# Patient Record
Sex: FEMALE | Race: WHITE | NOT HISPANIC OR LATINO | Employment: FULL TIME | URBAN - METROPOLITAN AREA
[De-identification: names, ages, dates, MRNs, and addresses within clinical notes are randomized per-mention and may not be internally consistent; named-entity substitution may affect disease eponyms.]

---

## 2018-04-19 DIAGNOSIS — M54.2 CERVICALGIA: Primary | ICD-10-CM

## 2018-04-30 ENCOUNTER — CLINICAL SUPPORT (OUTPATIENT)
Dept: REHABILITATION | Facility: HOSPITAL | Age: 33
End: 2018-04-30
Payer: MEDICAID

## 2018-04-30 DIAGNOSIS — R53.1 WEAKNESS: ICD-10-CM

## 2018-04-30 DIAGNOSIS — R29.3 POSTURE IMBALANCE: ICD-10-CM

## 2018-04-30 PROCEDURE — 97162 PT EVAL MOD COMPLEX 30 MIN: CPT | Mod: PO

## 2018-04-30 PROCEDURE — 97110 THERAPEUTIC EXERCISES: CPT | Mod: PO

## 2018-05-01 NOTE — PLAN OF CARE
Physical Therapy Evaluation    Name: Mel Murphy  Clinic Number: [id number]    Medical Diagnosis: cervicalgia  PT Diagnosis:   Encounter Diagnoses   Name Primary?    Weakness     Posture imbalance      Physician: Rebecca Cotton, *  Treatment Orders: PT Eval and Treat    History     No past medical history on file.  No current outpatient prescriptions on file.     No current facility-administered medications for this visit.      Review of patient's allergies indicates:  Allergies not on file    Precautions: standard    Evaluation Date: 4/30/18  Visit # authorized: 1/20  Authorization period: 12/31/18  Plan of care Expiration: 6/29/18    Subjective   PMH: left cervical strain  PLOF: bootcamp: 2x/wk; work related activities    Occupation: Pt works as an  and job related duties include computer and phone usage    Primary concern/ Chief complaints:    Mel is a 32 y.o. female that presents to [facility] secondary to cervicalgia. Injury/surgery occurred approximately: ~3 weeks. Pt. presents with the following co-morbidities and personal factors that directly impact her plan of care: macromastia. X-ray/MRI was taken and revealed: none on file. Pt denies numbness and tingling in UE.  Pt denies SOB and/or pain radiating from back to front or along rib cage.     Vertebral artery symptoms: PMH of headaches (tension)    Red flags:  Pt. denies bowel/bladder symptoms. Recent weight loss? No; Constant/Night pain that is unchanging with change of position? No; PMH of CA? No    Onset/HALEY: sudden: ~3 weeks ago pt. was at work performing her job duties when she started experiencing right sided neck pain of insidious onset. Pt. reports she had just returned from vacation and did not perform any activity other than her usual job description. Pt. reports that she holds her phone at work between her shoulder and head (since 10/2017) due to new phones. Pt. went to the ER: 4/10/18: pt.  was given a prescription for muscle relaxer, pain medication; and x-rays were performed. Pt.'s current symptoms include limited movement, headaches, sleep disturbance, and job related activities increase pain (progressive throughout the day).     Previous treatment: none.    Pain Scale: Mel rates pain on a scale of 0-10 to be 10 at worst; 0 currently; 0 at best .    Aggravating Factors:driving, work related activities, sleep disturbance, reaching/lifting a 25# box overhead, carrying objects on shoulders, and work out regimen (bootcamp 2x/wk)  Relieving Factors: muscle relaxers prn and has a headset ordered    ADLs: Pt has a decrease ability to perform ADLs such as see above.    Patient Goals: Pt would like to decrease pain and increase function so she can return to her normal daily activities: prevent re-injury and return to PLOF    Objective     Observation: kyphotic posturing, macromastia    Posture: subcranial hyperextension, cervical flattened lordosis, FHP, mild Tspine kyphosis, rolled anterior/IR shoulders, and shoulder protraction    Cervical ROM: (measured in degrees)    Degrees Quality   Flexion 45 ERP centrally   Right Rotation 70 tightness while moving   Left Rotation 70    Right SB 40 ERP   Left SB 40      Shoulder Active/ Passive ROM: (measured in degrees)   Shoulder Right UE Left UE   Flexion  WNLs WNLs   Abduction WNLs WNLs   ER WNLs WNLs   IR WNLs WNLs     Sensation: Dermatomes: Intact bilateral UEs    Reflexes: C5/T1: 2 bilaterally    Strength: (measured in neutral spine, gradin-5 out of 5)   Cervical MMT   Flexion 3-/5   Extension 3-/5   Right Side Bend 3-/5   Left Side Bend 3-/5   Upper trap. 3+/5     Upper Extremity Strength: (grading 1-5 out of 5)      Right UE Left UE   Shoulder Flexion: 3+/5 3+/5   Shoulder Abduction: 3+/5 3+/5   Shoulder ER: 3-/5 3-/5   Shoulder IR: 3/5 3/5        Elbow Flexion: 3+/5 4-/5   Elbow Extension: 4-/5 4-/5         #1 5 5    #2      #3           EPL: 3-/5 3/5        Lower Trap: 3-/5 3-/5   Middle Trap: 3/5 3-/5   Rhomboids: 3/5 3-/5     Cervical Spine Special Tests: ((+): positive; (-): negative)   Compression -   Spurling's A +   Spurling's B +   Distraction -   Deep neck flexor test +   First Rib + bilaterallly left greater than right     Cervical musculature flexibility: (restriction: mild, mod.: moderate, severe grading)   Muscle Flexibility   suboccipitals mod.   upper trapezius mod.   levator scapula mod.   middle scalenes mod.   posterior scalenes mod.     Palpation for condition:   · Position: subcranial backward nod positional fault  · Warmth: normal  · Swelling: none  · Texture: hypertonic in the above musculature    Joint Mobility: (graded 0-6 out of 6) left mid to lower cervical downslides/upslides: 2/6; left 1st rib downslides: 2/6    Outcome measures:   Functional Status Measures:    Intake Score     Pts Physical FS Primary Measure      59                          Risk Adjustment Statistical FOTO     58        PT reviewed FOTO scores for Mel Murphy on 04/30/2018.   FOTO scores were entered into EPIC - see media section.    History  Co-morbidities and personal factors that may impact the plan of care Examination  Body Structures and Functions, activity limitations and participation restrictions that may impact the plan of care Clinical Presentation   Decision Making/ Complexity Score   Co-morbidities:   macromastia            Personal Factors:   none Body Regions: cervical/shoulder    Body Systems: Decreased neck AROM; cervical/shoulder weakness; poor posture; pain with transitional activities, decrease exercise ability, and pain with ADLs.     Activity limitations: work related activities, prolonged sitting, sleep disturbance, driving, lifting/reaching OH, and mod. to heavy activities    Participation Restrictions: work related activities evolving with changing clinical characteristics   moderate     Clinical Presentation/complexity  category  Moderate complexity category: pt. has 1-2 personal factors and/or co-morbidities directly  impacting POC, 3 or more body system impairments/functional limitations/participation restrictions; as well as, condition is evolving with changing clinical characteristics.    PT Evaluation Completed? Yes  Discussed Plan of Care with patient: Yes    TREATMENT:  Therapeutic exercise: Mel received therapeutic exercises to develop strength and endurance, flexibility for 10 minutes including: chin tucks, pec. stretching, levator stretch, right UT stretch with rib self mobilization, and scapula retraction    Pt. Education: Instructed pt. regarding: HEP including proper form/technique; body mechanics, and posture re-education x 5min.. Pt demonstrated and verbalized good understanding of the education provided. Mel demonstrated good return demonstration of activities.  · No cultural, environmental, or spiritual barriers identified to treatment or learning.    Medical necessity is demonstrated by the following IMPAIRMENTS/PROBLEM LIST:   1) Pain limiting function   2) Postural dysfunction   3) Longus colli/suboccipital tightness   4) Upper trapezius/levator scapula tightness   5) Longus colli/scapula stabilizer weakness    6) Decreased cervical/shoulder ROM    7) Decreased cervical and thoracic joint mobility   8) Decreased UT/levator scapula/scalenes soft tissue extensibility   9) Lack of HEP    GOALS:   Short Term Goals: 4 weeks  1. Pt. to report decreased cervical pain  </=  5/10 at worst to increase tolerance for upright unsupported sitting  2. Pt. to demonstrate proper cervical and scapula retraction requiring min. to no verbal cues from PT  3. Increase left lower cervical joint mobility to 2+/6 to promote greater ease with ADLs  4. Increase thoracic joint mobility to 2+/6 to promote greater ease with self care skills  5. Pt. to demonstrate increased MMT for cervical paraspinals to 3/5 to improve tolerance to  upright unsupported sitting posture.  6. Pt. to demonstrate increased MMT for bilateral shoulder flexion to 4/5 to improve ability to reach/lift OH.   7. Pt. to demonstrate increased MMT for mid-trap/lower trap strength to 3/5 to improve endurance with maintaining upright posture.  8. Pt to tolerate HEP to improve ROM and independence with ADLs    Long Term Goals: 8 weeks  1. Pt. to report decreased cervical pain </ =  2/10 at worst to increase tolerance for upright unsupported sitting posture  2. Pt. to demonstrate proper cervical and scapula retraction requiring no verbal cues from PT  3. Increase left lower cervical joint mobility to 3-/6 to promote greater ease with self care skills  4. Increase thoracic joint mobility to 3-/6 to promote greater ease with self care skills  5. Increase bilateral cervical rotation AROM to 80 degrees to promote greater ease with driving  6. Pt. to demonstrate increased MMT for cervical paraspinals to 3+/5 to improve endurance with unsupported sitting posture    Assessment     This is a 32 y.o. female referred to outpatient physical therapy who presents with a medical diagnosis of cervicalgia and PT diagnosis of weakness and posture imbalance demonstrating joint dysfunction and functional limitation as described above. Level of complexity is moderate;  based on patient's past medical history including the above co-morbidities and personal factors; functional limitations, and clinical presentation directly impacting his/her plan of care.     Patient was in agreement with set goals. Pt was given a HEP. Pt verbally understood the instructions and demonstrated proper form/technique. Pt was advised to perform these exercises free of pain and to discontinue use if symptoms worsen. Pt will benefit from physical therapy services in order to maximize painfree functional independence.    Plan     Pt will be treated by physical therapy 1-3 times a week for 8 weeks for pt. education, HEP,  therapeutic exercises, neuromuscular re-education, joint/soft tissue mobilizations, and modalities, including but not exclusive to dry needling, prn to achieve established goals. Mel may at times be seen by a PTA as part of the Rehab Team.     I certify the need for these services furnished under this plan of treatment and while under my care.______________________________ Physician/Referring Practitioner  Date of Signature

## 2018-05-07 ENCOUNTER — CLINICAL SUPPORT (OUTPATIENT)
Dept: REHABILITATION | Facility: HOSPITAL | Age: 33
End: 2018-05-07
Payer: MEDICAID

## 2018-05-07 DIAGNOSIS — R29.3 POSTURE IMBALANCE: ICD-10-CM

## 2018-05-07 DIAGNOSIS — R53.1 WEAKNESS: ICD-10-CM

## 2018-05-07 PROCEDURE — 97110 THERAPEUTIC EXERCISES: CPT | Mod: PO

## 2018-05-07 PROCEDURE — 97140 MANUAL THERAPY 1/> REGIONS: CPT | Mod: PO

## 2018-05-07 NOTE — PROGRESS NOTES
Physical Therapy Progress Note     Name: Mel Murphy  Clinic Number: [id number]  Diagnosis:   Encounter Diagnoses   Name Primary?    Weakness     Posture imbalance      Physician: Rebecca Cotton, *  Treatment Orders: Therapeutic exercise  No past medical history on file.    Precautions: standard    Initial Evaluation:   Visit #: /  Authorization period Expiration:   Plan of Care Expiration:   Referring Provider: Rebecca Cotton, *    Subjective     Pt reports:     Pain Scale: before treatment: 2 currently; after treatment: 1    Objective     ROM: before treatment: ,after treatment: NT    Patient received individual therapy to increase strength, endurance, ROM, flexibility, posture and core stabilization with 1 patients with activities as follows:     Therapeutic exercise: Mel received therapeutic exercises to develop strength, endurance, ROM, flexibility, posture and core stabilization for 25 minutes including:     Warm-up:     Supine:   · cervical rotation: 2x8 bilaterally  · supine chin tucks:2x8  · pectoralis minor stretch lying over two towel rolls along length spine: 3'    Sidelying:   · shoulder ER: 2x8  · shoulder HAbd. with scapula retraction: 2x8  Prone:   ·     Sitting:  · seated UT and levator scapula stretch with strap stabilization: 3x30 sec. ea.  · Breugger's with YTB: 2x8    Standing:    Manual therapy: Mel received the following manual therapy techniques: Joint mobilizations and Soft tissue Mobilization were applied to: mid to lower cervical for 15 minutes.    Manual techniques include:  Soft tissue mobilization:   · SOR    Joint mobilization:   · mid-cervical up/downslides: specific and general   · manual cervical traction:  · 1st rib downglide with respiratory assist: right side  · GH posterolateral and inferior glides    Written Home Exercises Provided:   Including: Review and demonstration of : review of current exercise  regimen; Pt demo fair understanding of the education provided. Mel demonstrated fair return demonstration of activities.     Pt. education:  · Posture reeducation: cervical/scapula retraction, body mechanics, HEP, importance of compliance; and activity modification/avoidance   · No spiritual or educational barriers to learning provided  · Pt has no cultural, educational or language barriers to learning provided.    Assessment     Pt. had good tolerance to manual therapy and exercise denying increased pain. Will continue to progress cervical ROM and postural endurance/strength.     Pt will continue to benefit from skilled outpatient physical therapy to address the remaining functional deficits, provide pt/family education, and to maximize pt's level of independence in the home and community environment.     Anticipated barriers to physical therapy: chronicity of condition  · Pt's spiritual, cultural and educational needs considered and pt agreeable to plan of care and goals as stated below:   Medical necessity is demonstrated by the following IMPAIRMENTS/PROBLEM LIST:              1) Pain limiting function              2) Postural dysfunction              3) Longus colli/suboccipital tightness              4) Upper trapezius/levator scapula tightness              5) Longus colli/scapula stabilizer weakness               6) Decreased cervical/shoulder ROM               7) Decreased cervical and thoracic joint mobility              8) Decreased UT/levator scapula/scalenes soft tissue extensibility              9) Lack of HEP     GOALS:   Short Term Goals: 4 weeks  1. Pt. to report decreased cervical pain  </=  5/10 at worst to increase tolerance for upright unsupported sitting  2. Pt. to demonstrate proper cervical and scapula retraction requiring min. to no verbal cues from PT  3. Increase left lower cervical joint mobility to 2+/6 to promote greater ease with ADLs  4. Increase thoracic joint mobility to 2+/6 to  promote greater ease with self care skills  5. Pt. to demonstrate increased MMT for cervical paraspinals to 3/5 to improve tolerance to upright unsupported sitting posture.  6. Pt. to demonstrate increased MMT for bilateral shoulder flexion to 4/5 to improve ability to reach/lift OH.   7. Pt. to demonstrate increased MMT for mid-trap/lower trap strength to 3/5 to improve endurance with maintaining upright posture.  8. Pt to tolerate HEP to improve ROM and independence with ADLs     Long Term Goals: 8 weeks  1. Pt. to report decreased cervical pain </ =  2/10 at worst to increase tolerance for upright unsupported sitting posture  2. Pt. to demonstrate proper cervical and scapula retraction requiring no verbal cues from PT  3. Increase left lower cervical joint mobility to 3-/6 to promote greater ease with self care skills  4. Increase thoracic joint mobility to 3-/6 to promote greater ease with self care skills  5. Increase bilateral cervical rotation AROM to 80 degrees to promote greater ease with driving  6. Pt. to demonstrate increased MMT for cervical paraspinals to 3+/5 to improve endurance with unsupported sitting posture     Plan   Continue with established Plan of Care towards PT goals.

## 2018-05-10 ENCOUNTER — CLINICAL SUPPORT (OUTPATIENT)
Dept: REHABILITATION | Facility: HOSPITAL | Age: 33
End: 2018-05-10
Payer: MEDICAID

## 2018-05-10 DIAGNOSIS — R29.3 POSTURE IMBALANCE: ICD-10-CM

## 2018-05-10 DIAGNOSIS — R53.1 WEAKNESS: ICD-10-CM

## 2018-05-10 PROCEDURE — 97110 THERAPEUTIC EXERCISES: CPT | Mod: PO

## 2018-05-10 NOTE — PROGRESS NOTES
Physical Therapy Progress Note     Name: Mel Murphy  Clinic Number: [id number]  Diagnosis:   Encounter Diagnoses   Name Primary?    Weakness     Posture imbalance      Physician: Rebecca Cotton, *  Treatment Orders: Therapeutic exercise  No past medical history on file.    Precautions: standard    Initial Evaluation: 4/30/18  Visit #:3 /20  Authorization period: 12/31/18  Plan of care Expiration: 6/29/18  Referring Provider: Rebecca Cotton, *   Time in: 4:00  Time out: 5:00  Treatment time: 55    Subjective     Pt reports: some continued (R) side neck/UT pain. She reports increased (R) shoulder soreness after mobs last visit. States that she is still waiting on her headset for her phone at the office.    Pain Scale: before treatment: 0 currently; after treatment:0/10     Objective       Patient received individual therapy to increase strength, endurance, ROM, flexibility, posture and core stabilization with 1 patients with activities as follows:     Therapeutic exercise: Mel received therapeutic exercises to develop strength, endurance, ROM, flexibility, posture and core stabilization for 45 minutes including:     Warm-up:   · UBE x 4 minutes level 1 ( 2 minutes forward/backward)    Supine:   · cervical rotation: 2x10 bilaterally  · supine chin tucks:2x10  · pectoralis minor stretch lying over two towel rolls along length spine: 3'    Sidelying:   · shoulder ER: 2x10  · shoulder HAbd. with scapula retraction: 2x10    Prone: (NP)  ·     Sitting:  · seated (R)UT and levator scapula stretch with strap stabilization: 2x30 sec. ea.  · Breugger's with YTB: 2x 10    Standing:  · Corner pec stretch 2 x 30 sec  · serrarus wall slides 2 x 8  · scap retract/row with YTB 2 x 8  · Shoulder ext with YTB 2 x 8    Manual therapy: Mel received the following manual therapy techniques: Joint mobilizations and Soft tissue Mobilization were applied to: mid to  lower cervical for 10 minutes.    Manual techniques include:  Soft tissue mobilization:   · STM cervical paraspinals/UT     Joint mobilization: (NP)  · mid-cervical up/downslides: specific and general   · manual cervical traction:  · 1st rib downglide with respiratory assist: right side--NP  · GH posterolateral and inferior glides--NP    Written Home Exercises Provided: None new added she was educated to continue with previous to tolerance.  Including: Review and demonstration of : review of current exercise regimen; Pt demo fair understanding of the education provided. Mel demonstrated fair return demonstration of activities.     Pt. education:  · Posture reeducation: cervical/scapula retraction, body mechanics, HEP, importance of compliance; and activity modification/avoidance   · No spiritual or educational barriers to learning provided  · Pt has no cultural, educational or language barriers to learning     Assessment     Patient tori Tx well. She was able to perform and progress with ex's/activities with muscular fatigue only and without increased pain symptoms. Minimal cuing required for postural awareness. Some UT tension evident which was diminished with soft tissue work. Shoulder mobs not performed today as patient citing this as source of residual shoulder discomfort after last visit.  Unfortunately, she has yet to receive her phone headset for work and this is likely contributing to her symptoms. No c/o pain post Tx.  Will continue to progress cervical ROM and postural endurance/strength.     Pt will continue to benefit from skilled outpatient physical therapy to address the remaining functional deficits, provide pt/family education, and to maximize pt's level of independence in the home and community environment.     Anticipated barriers to physical therapy: chronicity of condition  · Pt's spiritual, cultural and educational needs considered and pt agreeable to plan of care and goals as stated below:    Medical necessity is demonstrated by the following IMPAIRMENTS/PROBLEM LIST:              1) Pain limiting function              2) Postural dysfunction              3) Longus colli/suboccipital tightness              4) Upper trapezius/levator scapula tightness              5) Longus colli/scapula stabilizer weakness               6) Decreased cervical/shoulder ROM               7) Decreased cervical and thoracic joint mobility              8) Decreased UT/levator scapula/scalenes soft tissue extensibility              9) Lack of HEP     GOALS:   Short Term Goals: 4 weeks  1. Pt. to report decreased cervical pain  </=  5/10 at worst to increase tolerance for upright unsupported sitting  2. Pt. to demonstrate proper cervical and scapula retraction requiring min. to no verbal cues from PT  3. Increase left lower cervical joint mobility to 2+/6 to promote greater ease with ADLs  4. Increase thoracic joint mobility to 2+/6 to promote greater ease with self care skills  5. Pt. to demonstrate increased MMT for cervical paraspinals to 3/5 to improve tolerance to upright unsupported sitting posture.  6. Pt. to demonstrate increased MMT for bilateral shoulder flexion to 4/5 to improve ability to reach/lift OH.   7. Pt. to demonstrate increased MMT for mid-trap/lower trap strength to 3/5 to improve endurance with maintaining upright posture.  8. Pt to tolerate HEP to improve ROM and independence with ADLs     Long Term Goals: 8 weeks  1. Pt. to report decreased cervical pain </ =  2/10 at worst to increase tolerance for upright unsupported sitting posture  2. Pt. to demonstrate proper cervical and scapula retraction requiring no verbal cues from PT  3. Increase left lower cervical joint mobility to 3-/6 to promote greater ease with self care skills  4. Increase thoracic joint mobility to 3-/6 to promote greater ease with self care skills  5. Increase bilateral cervical rotation AROM to 80 degrees to promote greater ease  with driving  6. Pt. to demonstrate increased MMT for cervical paraspinals to 3+/5 to improve endurance with unsupported sitting posture     Plan   Continue with established Plan of Care towards PT goals.

## 2018-05-21 ENCOUNTER — CLINICAL SUPPORT (OUTPATIENT)
Dept: REHABILITATION | Facility: HOSPITAL | Age: 33
End: 2018-05-21
Payer: MEDICAID

## 2018-05-21 DIAGNOSIS — R29.3 POSTURE IMBALANCE: ICD-10-CM

## 2018-05-21 DIAGNOSIS — R53.1 WEAKNESS: ICD-10-CM

## 2018-05-21 PROCEDURE — 97110 THERAPEUTIC EXERCISES: CPT | Mod: PO

## 2018-05-21 PROCEDURE — 97140 MANUAL THERAPY 1/> REGIONS: CPT | Mod: PO

## 2018-05-21 NOTE — PROGRESS NOTES
Physical Therapy Progress Note     Name: Mel Murphy  Clinic Number: [id number]  Diagnosis:   Encounter Diagnoses   Name Primary?    Weakness     Posture imbalance      Physician: Rebecca Cotton, *  Treatment Orders: Therapeutic exercise  No past medical history on file.    Precautions: standard    Initial Evaluation: 4/30/18  Visit #:4 /20  Authorization period: 12/31/18  Plan of care Expiration: 6/29/18  Referring Provider: Rebecca Cotton, *   Time in: 4:00  Time out: 5:15  Treatment time: 55    Subjective     Pt reports: she is feeling better in regards to her neck. Notes much improved AROM and function, but now she is experiencing greater right elbow pain when she extends her elbow.     Pain Scale: before treatment: 0 currently; after treatment:0/10     Objective     Patient received individual therapy to increase strength, endurance, ROM, flexibility, posture and core stabilization with 1 patients with activities as follows:     Therapeutic exercise: Mel received therapeutic exercises to develop strength, endurance, ROM, flexibility, posture and core stabilization for 45 minutes including:     Warm-up:   · UBE x 4 minutes level 1 ( 2 minutes forward/backward)    Supine:   · cervical rotation: 2x10 bilaterally  · supine chin tucks:2x10  · pectoralis minor stretch lying over two towel rolls along length spine: 3'    Sidelying:   · shoulder ER: 3x10  · shoulder HAbd. with scapula retraction: 3x10    Prone: (NP)  ·     Sitting:  · seated (R)UT and levator scapula stretch with strap stabilization: 2x30 sec. ea.  · Breugger's with YTB: 2x 10    Standing:  · Corner pec stretch 2 x 30 sec  · serrarus wall slides 2 x 8 NP  · scap retract/row with YTB 2 x 8 NP  · Shoulder ext with YTB 2 x 8    Manual therapy: Mel received the following manual therapy techniques: Joint mobilizations and Soft tissue Mobilization were applied to: mid to lower  cervical for 10 minutes.    Manual techniques include:  Soft tissue mobilization:   · suboccipital release    Joint mobilization:  · subcranial distraction  · mid-cervical up/downslides: specific and general   · manual cervical traction:  · seated 1st rib downglide with respiratory assist: left side    Written Home Exercises Provided: None new added she was educated to continue with previous to tolerance.  Including: Review and demonstration of : review of current exercise regimen; Pt demo fair understanding of the education provided. Mel demonstrated fair return demonstration of activities.     Pt. education:  · Posture reeducation: cervical/scapula retraction, body mechanics, HEP, importance of compliance; and activity modification/avoidance   · No spiritual or educational barriers to learning provided  · Pt has no cultural, educational or language barriers to learning     Assessment     Patient had much improved joint mobility and tolerance to manual therapy denying increased pain. Pt. reported immediate relief of pain with left rotation after first rib mobilization. Pt. is complaining more of right lateral epicondylitis at this time, which is more than likely related to work (typing on keyboard). Pt. was given an update HEP including ice massage and bracing for now. If symptoms persist a formal evaluation will be warranted with possible referral to OT if pt. would like to continue for her cervical spine. Pt. agreed with plan. Will continue to progress cervical ROM and postural endurance/strength.     Pt will continue to benefit from skilled outpatient physical therapy to address the remaining functional deficits, provide pt/family education, and to maximize pt's level of independence in the home and community environment.     Anticipated barriers to physical therapy: chronicity of condition  · Pt's spiritual, cultural and educational needs considered and pt agreeable to plan of care and goals as stated below:    Medical necessity is demonstrated by the following IMPAIRMENTS/PROBLEM LIST:              1) Pain limiting function              2) Postural dysfunction              3) Longus colli/suboccipital tightness              4) Upper trapezius/levator scapula tightness              5) Longus colli/scapula stabilizer weakness               6) Decreased cervical/shoulder ROM               7) Decreased cervical and thoracic joint mobility              8) Decreased UT/levator scapula/scalenes soft tissue extensibility              9) Lack of HEP     GOALS:   Short Term Goals: 4 weeks  1. Pt. to report decreased cervical pain  </=  5/10 at worst to increase tolerance for upright unsupported sitting  2. Pt. to demonstrate proper cervical and scapula retraction requiring min. to no verbal cues from PT  3. Increase left lower cervical joint mobility to 2+/6 to promote greater ease with ADLs  4. Increase thoracic joint mobility to 2+/6 to promote greater ease with self care skills  5. Pt. to demonstrate increased MMT for cervical paraspinals to 3/5 to improve tolerance to upright unsupported sitting posture.  6. Pt. to demonstrate increased MMT for bilateral shoulder flexion to 4/5 to improve ability to reach/lift OH.   7. Pt. to demonstrate increased MMT for mid-trap/lower trap strength to 3/5 to improve endurance with maintaining upright posture.  8. Pt to tolerate HEP to improve ROM and independence with ADLs     Long Term Goals: 8 weeks  1. Pt. to report decreased cervical pain </ =  2/10 at worst to increase tolerance for upright unsupported sitting posture  2. Pt. to demonstrate proper cervical and scapula retraction requiring no verbal cues from PT  3. Increase left lower cervical joint mobility to 3-/6 to promote greater ease with self care skills  4. Increase thoracic joint mobility to 3-/6 to promote greater ease with self care skills  5. Increase bilateral cervical rotation AROM to 80 degrees to promote greater ease  with driving  6. Pt. to demonstrate increased MMT for cervical paraspinals to 3+/5 to improve endurance with unsupported sitting posture     Plan   Continue with established Plan of Care towards PT goals.

## 2018-08-01 ENCOUNTER — DOCUMENTATION ONLY (OUTPATIENT)
Dept: REHABILITATION | Facility: HOSPITAL | Age: 33
End: 2018-08-01

## 2018-08-01 DIAGNOSIS — R53.1 WEAKNESS: ICD-10-CM

## 2018-08-01 DIAGNOSIS — R29.3 POSTURE IMBALANCE: ICD-10-CM

## 2018-08-01 NOTE — PROGRESS NOTES
PHYSICAL THERAPY DISCHARGE SUMMARY     Name: Mel Murphy  Clinic Number: [id number]    Diagnosis:   Encounter Diagnoses   Name Primary?    Weakness     Posture imbalance      Physician: Rebecca Momin NP  Treatment Orders: Therapeutic exercise  No past medical history on file.    Initial visit: 4/30/18  Date of Last visit: 5/21/18  Date of Discharge Note:  8/1/18  Total Visits Received: 4  Missed Visits: 7  ASSESSMENT   Status Towards Goals Met:  Not met due to non-compliance    Goals Not achieved and why: see above    Discharge reason : Pt has not re-scheduled further follow-up sessions    PLAN   This patient is discharged from Physical Therapy Services.       GOALS:   Short Term Goals: 4 weeks  1. Pt. to report decreased cervical pain  </=  5/10 at worst to increase tolerance for upright unsupported sitting  2. Pt. to demonstrate proper cervical and scapula retraction requiring min. to no verbal cues from PT  3. Increase left lower cervical joint mobility to 2+/6 to promote greater ease with ADLs  4. Increase thoracic joint mobility to 2+/6 to promote greater ease with self care skills  5. Pt. to demonstrate increased MMT for cervical paraspinals to 3/5 to improve tolerance to upright unsupported sitting posture.  6. Pt. to demonstrate increased MMT for bilateral shoulder flexion to 4/5 to improve ability to reach/lift OH.   7. Pt. to demonstrate increased MMT for mid-trap/lower trap strength to 3/5 to improve endurance with maintaining upright posture.  8. Pt to tolerate HEP to improve ROM and independence with ADLs     Long Term Goals: 8 weeks  1. Pt. to report decreased cervical pain </ =  2/10 at worst to increase tolerance for upright unsupported sitting posture  2. Pt. to demonstrate proper cervical and scapula retraction requiring no verbal cues from PT  3. Increase left lower cervical joint mobility to 3-/6 to promote greater ease with self care skills  4. Increase thoracic joint mobility to 3-/6  to promote greater ease with self care skills  5. Increase bilateral cervical rotation AROM to 80 degrees to promote greater ease with driving  6. Pt. to demonstrate increased MMT for cervical paraspinals to 3+/5 to improve endurance with unsupported sitting posture

## 2019-01-18 ENCOUNTER — OFFICE VISIT (OUTPATIENT)
Dept: URGENT CARE | Facility: CLINIC | Age: 34
End: 2019-01-18
Payer: MEDICAID

## 2019-01-18 VITALS
BODY MASS INDEX: 38.98 KG/M2 | TEMPERATURE: 99 F | OXYGEN SATURATION: 98 % | HEIGHT: 63 IN | RESPIRATION RATE: 16 BRPM | WEIGHT: 220 LBS | DIASTOLIC BLOOD PRESSURE: 70 MMHG | HEART RATE: 93 BPM | SYSTOLIC BLOOD PRESSURE: 118 MMHG

## 2019-01-18 DIAGNOSIS — J01.00 ACUTE NON-RECURRENT MAXILLARY SINUSITIS: Primary | ICD-10-CM

## 2019-01-18 LAB
CTP QC/QA: YES
FLUAV AG NPH QL: NEGATIVE
FLUBV AG NPH QL: NEGATIVE

## 2019-01-18 PROCEDURE — 99203 OFFICE O/P NEW LOW 30 MIN: CPT | Mod: S$GLB,,, | Performed by: PHYSICIAN ASSISTANT

## 2019-01-18 PROCEDURE — 87804 POCT INFLUENZA A/B: ICD-10-PCS | Mod: QW,S$GLB,, | Performed by: PHYSICIAN ASSISTANT

## 2019-01-18 PROCEDURE — 87804 INFLUENZA ASSAY W/OPTIC: CPT | Mod: QW,S$GLB,, | Performed by: PHYSICIAN ASSISTANT

## 2019-01-18 PROCEDURE — 99203 PR OFFICE/OUTPT VISIT, NEW, LEVL III, 30-44 MIN: ICD-10-PCS | Mod: S$GLB,,, | Performed by: PHYSICIAN ASSISTANT

## 2019-01-18 RX ORDER — FLUTICASONE PROPIONATE 50 MCG
1 SPRAY, SUSPENSION (ML) NASAL DAILY
Qty: 1 BOTTLE | Refills: 0 | Status: SHIPPED | OUTPATIENT
Start: 2019-01-18

## 2019-01-18 RX ORDER — DEXAMETHASONE SODIUM PHOSPHATE 100 MG/10ML
8 INJECTION INTRAMUSCULAR; INTRAVENOUS
Status: COMPLETED | OUTPATIENT
Start: 2019-01-18 | End: 2019-01-18

## 2019-01-18 RX ADMIN — DEXAMETHASONE SODIUM PHOSPHATE 8 MG: 100 INJECTION INTRAMUSCULAR; INTRAVENOUS at 11:01

## 2019-01-18 NOTE — PATIENT INSTRUCTIONS
Sinusitis (No Antibiotics)    The sinuses are air-filled spaces within the bones of the face. They connect to the inside of the nose. Sinusitis is an inflammation of the tissue lining the sinus cavity. Sinus inflammation can occur during a cold. It can also be due to allergies to pollens and other particles in the air. It can cause symptoms such as sinus congestion, headache, sore throat, facial swelling and fullness. It may also cause a low-grade fever. No infection is present, and no antibiotic treatment is needed.  Home care  · Drink plenty of water, hot tea, and other liquids. This may help thin mucus. It also may promote sinus drainage.  · Heat may help soothe painful areas of the face. Use a towel soaked in hot water. Or,  the shower and direct the hot spray onto your face. Using a vaporizer along with a menthol rub at night may also help.   · An expectorant containing guaifenesin may help thin the mucus and promote drainage from the sinuses.  · Over-the-counter decongestants may be used unless a similar medicine was prescribed. Nasal sprays work the fastest. Use one that contains phenylephrine or oxymetazoline. First blow the nose gently. Then use the spray. Do not use these medicines more often than directed on the label or symptoms may get worse. You may also use tablets containing pseudoephedrine. Avoid products that combine ingredients, because side effects may be increased. Read labels. You can also ask the pharmacist for help. (NOTE: Persons with high blood pressure should not use decongestants. They can raise blood pressure.)  · Over-the-counter antihistamines may help if allergies contributed to your sinusitis.    · Use acetaminophen or ibuprofen to control pain, unless another pain medicine was prescribed. (If you have chronic liver or kidney disease or ever had a stomach ulcer, talk with your doctor before using these medicines. Aspirin should never be used in anyone under 18 years of age  who is ill with a fever. It may cause severe liver damage.)  · Use nasal rinses or irrigation as instructed by your health care provider.  · Don't smoke. This can worsen symptoms.  Follow-up care  Follow up with your healthcare provider or our staff if you are not improving within the next week.  When to seek medical advice  Call your healthcare provider if any of these occur:  · Green or yellow discharge from the nose or into the throat  · Facial pain or headache becoming more severe  · Stiff neck  · Unusual drowsiness or confusion  · Swelling of the forehead or eyelids  · Vision problems, including blurred or double vision  · Fever of 100.4ºF (38ºC) or higher, or as directed by your healthcare provider  · Seizure  · Breathing problems  · Symptoms not resolving within 10 days  Date Last Reviewed: 4/13/2015  © 8158-1025 [employer]. [address] [place] [address]. All rights reserved. This information is not intended as a substitute for professional medical care. Always follow your healthcare professional's instructions.      Please follow up with your Primary care provider within 2-5 days if your signs and symptoms have not resolved or worsen.     If your condition worsens or fails to improve we recommend that you receive another evaluation at the emergency room immediately or contact your primary medical clinic to discuss your concerns.   You must understand that you have received an Urgent Care treatment only and that you may be released before all of your medical problems are known or treated. You, the patient, will arrange for follow up care as instructed.     RED FLAGS/WARNING SYMPTOMS DISCUSSED WITH PATIENT THAT WOULD WARRANT EMERGENT MEDICAL ATTENTION. PATIENT VERBALIZED UNDERSTANDING.

## 2019-01-18 NOTE — PROGRESS NOTES
"Subjective:       Patient ID: Mel Murphy is a 33 y.o. female.    Vitals:  height is 5' 3" (1.6 m) and weight is 99.8 kg (220 lb). Her oral temperature is 98.5 °F (36.9 °C). Her blood pressure is 118/70 and her pulse is 93. Her respiration is 16 and oxygen saturation is 98%.     Chief Complaint: Cough    Cough   This is a new problem. The current episode started yesterday. The problem occurs hourly. Associated symptoms include ear congestion, nasal congestion, postnasal drip, rhinorrhea and a sore throat. Pertinent negatives include no chest pain, chills, ear pain, eye redness, fever, headaches, hemoptysis, myalgias, rash, shortness of breath, sweats, weight loss or wheezing. Nothing aggravates the symptoms. She has tried nothing for the symptoms. The treatment provided no relief.       Constitution: Negative for chills, sweating, fatigue and fever.   HENT: Positive for congestion, postnasal drip, sinus pressure and sore throat. Negative for ear pain and voice change.    Neck: Negative for painful lymph nodes.   Cardiovascular: Negative for chest pain.   Eyes: Negative for eye redness.   Respiratory: Positive for cough and sputum production. Negative for chest tightness, bloody sputum, COPD, shortness of breath, stridor, wheezing and asthma.    Gastrointestinal: Negative for nausea and vomiting.   Musculoskeletal: Negative for muscle ache.   Skin: Negative for rash.   Allergic/Immunologic: Negative for seasonal allergies and asthma.   Neurological: Negative for headaches.   Hematologic/Lymphatic: Negative for swollen lymph nodes.       Objective:      Physical Exam   Constitutional: She is oriented to person, place, and time. She appears well-developed and well-nourished. She is cooperative.  Non-toxic appearance. She does not appear ill. No distress.   HENT:   Head: Normocephalic and atraumatic.   Right Ear: Hearing, tympanic membrane, external ear and ear canal normal.   Left Ear: Hearing, tympanic membrane, " external ear and ear canal normal.   Nose: Mucosal edema, rhinorrhea and sinus tenderness present. No nasal deformity. No epistaxis. Right sinus exhibits maxillary sinus tenderness. Right sinus exhibits no frontal sinus tenderness. Left sinus exhibits maxillary sinus tenderness. Left sinus exhibits no frontal sinus tenderness.   Mouth/Throat: Uvula is midline, oropharynx is clear and moist and mucous membranes are normal. No trismus in the jaw. Normal dentition. No uvula swelling. No posterior oropharyngeal erythema.   Eyes: Conjunctivae and lids are normal. No scleral icterus.   Sclera clear bilat   Neck: Trachea normal, full passive range of motion without pain and phonation normal. Neck supple.   Cardiovascular: Normal rate, regular rhythm, normal heart sounds, intact distal pulses and normal pulses.   Pulmonary/Chest: Effort normal and breath sounds normal. No accessory muscle usage or stridor. No respiratory distress. She has no decreased breath sounds. She has no wheezes. She has no rhonchi. She has no rales.   Abdominal: Soft. Normal appearance and bowel sounds are normal. She exhibits no distension. There is no tenderness.   Musculoskeletal: Normal range of motion. She exhibits no edema or deformity.   Neurological: She is alert and oriented to person, place, and time. She exhibits normal muscle tone. Coordination normal.   Skin: Skin is warm, dry and intact. She is not diaphoretic. No pallor.   Psychiatric: She has a normal mood and affect. Her speech is normal and behavior is normal. Judgment and thought content normal. Cognition and memory are normal.   Nursing note and vitals reviewed.      Assessment:       1. Acute non-recurrent maxillary sinusitis        Plan:         Acute non-recurrent maxillary sinusitis  -     POCT Influenza A/B  -     fluticasone (FLONASE) 50 mcg/actuation nasal spray; 1 spray (50 mcg total) by Each Nare route once daily.  Dispense: 1 Bottle; Refill: 0  -     dexamethasone  injection 8 mg          Sinusitis (No Antibiotics)    The sinuses are air-filled spaces within the bones of the face. They connect to the inside of the nose. Sinusitis is an inflammation of the tissue lining the sinus cavity. Sinus inflammation can occur during a cold. It can also be due to allergies to pollens and other particles in the air. It can cause symptoms such as sinus congestion, headache, sore throat, facial swelling and fullness. It may also cause a low-grade fever. No infection is present, and no antibiotic treatment is needed.  Home care  · Drink plenty of water, hot tea, and other liquids. This may help thin mucus. It also may promote sinus drainage.  · Heat may help soothe painful areas of the face. Use a towel soaked in hot water. Or,  the shower and direct the hot spray onto your face. Using a vaporizer along with a menthol rub at night may also help.   · An expectorant containing guaifenesin may help thin the mucus and promote drainage from the sinuses.  · Over-the-counter decongestants may be used unless a similar medicine was prescribed. Nasal sprays work the fastest. Use one that contains phenylephrine or oxymetazoline. First blow the nose gently. Then use the spray. Do not use these medicines more often than directed on the label or symptoms may get worse. You may also use tablets containing pseudoephedrine. Avoid products that combine ingredients, because side effects may be increased. Read labels. You can also ask the pharmacist for help. (NOTE: Persons with high blood pressure should not use decongestants. They can raise blood pressure.)  · Over-the-counter antihistamines may help if allergies contributed to your sinusitis.    · Use acetaminophen or ibuprofen to control pain, unless another pain medicine was prescribed. (If you have chronic liver or kidney disease or ever had a stomach ulcer, talk with your doctor before using these medicines. Aspirin should never be used in anyone  under 18 years of age who is ill with a fever. It may cause severe liver damage.)  · Use nasal rinses or irrigation as instructed by your health care provider.  · Don't smoke. This can worsen symptoms.  Follow-up care  Follow up with your healthcare provider or our staff if you are not improving within the next week.  When to seek medical advice  Call your healthcare provider if any of these occur:  · Green or yellow discharge from the nose or into the throat  · Facial pain or headache becoming more severe  · Stiff neck  · Unusual drowsiness or confusion  · Swelling of the forehead or eyelids  · Vision problems, including blurred or double vision  · Fever of 100.4ºF (38ºC) or higher, or as directed by your healthcare provider  · Seizure  · Breathing problems  · Symptoms not resolving within 10 days  Date Last Reviewed: 4/13/2015  © 2044-1594 [employer]. [address] [place] [address]. All rights reserved. This information is not intended as a substitute for professional medical care. Always follow your healthcare professional's instructions.      Please follow up with your Primary care provider within 2-5 days if your signs and symptoms have not resolved or worsen.     If your condition worsens or fails to improve we recommend that you receive another evaluation at the emergency room immediately or contact your primary medical clinic to discuss your concerns.   You must understand that you have received an Urgent Care treatment only and that you may be released before all of your medical problems are known or treated. You, the patient, will arrange for follow up care as instructed.     RED FLAGS/WARNING SYMPTOMS DISCUSSED WITH PATIENT THAT WOULD WARRANT EMERGENT MEDICAL ATTENTION. PATIENT VERBALIZED UNDERSTANDING.